# Patient Record
Sex: MALE | Race: WHITE | NOT HISPANIC OR LATINO | Employment: OTHER | URBAN - METROPOLITAN AREA
[De-identification: names, ages, dates, MRNs, and addresses within clinical notes are randomized per-mention and may not be internally consistent; named-entity substitution may affect disease eponyms.]

---

## 2017-06-06 ENCOUNTER — HOSPITAL ENCOUNTER (EMERGENCY)
Facility: HOSPITAL | Age: 52
Discharge: HOME | End: 2017-06-07
Attending: EMERGENCY MEDICINE
Payer: OTHER GOVERNMENT

## 2017-06-06 VITALS
HEART RATE: 90 BPM | BODY MASS INDEX: 29.78 KG/M2 | OXYGEN SATURATION: 96 % | HEIGHT: 70 IN | TEMPERATURE: 98 F | DIASTOLIC BLOOD PRESSURE: 87 MMHG | SYSTOLIC BLOOD PRESSURE: 185 MMHG | RESPIRATION RATE: 18 BRPM | WEIGHT: 208 LBS

## 2017-06-06 DIAGNOSIS — R06.02 SOB (SHORTNESS OF BREATH): Primary | ICD-10-CM

## 2017-06-06 DIAGNOSIS — F10.10 ALCOHOL ABUSE: ICD-10-CM

## 2017-06-06 LAB
ALBUMIN SERPL BCP-MCNC: 4.1 G/DL
ALP SERPL-CCNC: 78 U/L
ALT SERPL W/O P-5'-P-CCNC: 114 U/L
ANION GAP SERPL CALC-SCNC: 16 MMOL/L
AST SERPL-CCNC: 93 U/L
BASOPHILS # BLD AUTO: 0.07 K/UL
BASOPHILS NFR BLD: 1.3 %
BILIRUB SERPL-MCNC: 0.8 MG/DL
BUN SERPL-MCNC: 8 MG/DL
CALCIUM SERPL-MCNC: 9.6 MG/DL
CHLORIDE SERPL-SCNC: 103 MMOL/L
CO2 SERPL-SCNC: 22 MMOL/L
CREAT SERPL-MCNC: 1 MG/DL
DIFFERENTIAL METHOD: ABNORMAL
EOSINOPHIL # BLD AUTO: 0.1 K/UL
EOSINOPHIL NFR BLD: 1.3 %
ERYTHROCYTE [DISTWIDTH] IN BLOOD BY AUTOMATED COUNT: 13.6 %
EST. GFR  (AFRICAN AMERICAN): >60 ML/MIN/1.73 M^2
EST. GFR  (NON AFRICAN AMERICAN): >60 ML/MIN/1.73 M^2
ETHANOL SERPL-MCNC: 66 MG/DL
GLUCOSE SERPL-MCNC: 147 MG/DL
HCT VFR BLD AUTO: 43.2 %
HGB BLD-MCNC: 15.4 G/DL
LYMPHOCYTES # BLD AUTO: 1.1 K/UL
LYMPHOCYTES NFR BLD: 21.1 %
MCH RBC QN AUTO: 31.1 PG
MCHC RBC AUTO-ENTMCNC: 35.6 %
MCV RBC AUTO: 87 FL
MONOCYTES # BLD AUTO: 0.5 K/UL
MONOCYTES NFR BLD: 10.1 %
NEUTROPHILS # BLD AUTO: 3.5 K/UL
NEUTROPHILS NFR BLD: 66 %
PLATELET # BLD AUTO: 180 K/UL
PMV BLD AUTO: 10.1 FL
POTASSIUM SERPL-SCNC: 3.5 MMOL/L
PROT SERPL-MCNC: 7.9 G/DL
RBC # BLD AUTO: 4.95 M/UL
SODIUM SERPL-SCNC: 141 MMOL/L
WBC # BLD AUTO: 5.27 K/UL

## 2017-06-06 PROCEDURE — 80320 DRUG SCREEN QUANTALCOHOLS: CPT

## 2017-06-06 PROCEDURE — 96361 HYDRATE IV INFUSION ADD-ON: CPT

## 2017-06-06 PROCEDURE — 25000003 PHARM REV CODE 250: Performed by: EMERGENCY MEDICINE

## 2017-06-06 PROCEDURE — 63600175 PHARM REV CODE 636 W HCPCS: Performed by: EMERGENCY MEDICINE

## 2017-06-06 PROCEDURE — 96374 THER/PROPH/DIAG INJ IV PUSH: CPT

## 2017-06-06 PROCEDURE — 85025 COMPLETE CBC W/AUTO DIFF WBC: CPT

## 2017-06-06 PROCEDURE — 99284 EMERGENCY DEPT VISIT MOD MDM: CPT | Mod: 25

## 2017-06-06 PROCEDURE — 80053 COMPREHEN METABOLIC PANEL: CPT

## 2017-06-06 PROCEDURE — 96375 TX/PRO/DX INJ NEW DRUG ADDON: CPT

## 2017-06-06 RX ORDER — ONDANSETRON 2 MG/ML
4 INJECTION INTRAMUSCULAR; INTRAVENOUS
Status: COMPLETED | OUTPATIENT
Start: 2017-06-06 | End: 2017-06-06

## 2017-06-06 RX ORDER — LABETALOL HYDROCHLORIDE 5 MG/ML
10 INJECTION, SOLUTION INTRAVENOUS
Status: COMPLETED | OUTPATIENT
Start: 2017-06-06 | End: 2017-06-06

## 2017-06-06 RX ORDER — CHLORDIAZEPOXIDE HYDROCHLORIDE 5 MG/1
10 CAPSULE, GELATIN COATED ORAL 4 TIMES DAILY PRN
Qty: 20 CAPSULE | Refills: 0 | Status: SHIPPED | OUTPATIENT
Start: 2017-06-06 | End: 2017-07-06

## 2017-06-06 RX ORDER — DIAZEPAM 5 MG/1
10 TABLET ORAL
Status: COMPLETED | OUTPATIENT
Start: 2017-06-06 | End: 2017-06-06

## 2017-06-06 RX ORDER — ASPIRIN 81 MG/1
81 TABLET ORAL DAILY
COMMUNITY

## 2017-06-06 RX ORDER — DIAZEPAM 10 MG/2ML
5 INJECTION INTRAMUSCULAR
Status: COMPLETED | OUTPATIENT
Start: 2017-06-06 | End: 2017-06-06

## 2017-06-06 RX ORDER — SIMVASTATIN 20 MG/1
20 TABLET, FILM COATED ORAL NIGHTLY
COMMUNITY

## 2017-06-06 RX ORDER — METOPROLOL SUCCINATE 50 MG/1
50 TABLET, EXTENDED RELEASE ORAL DAILY
COMMUNITY

## 2017-06-06 RX ORDER — VALSARTAN AND HYDROCHLOROTHIAZIDE 320; 25 MG/1; MG/1
1 TABLET, FILM COATED ORAL DAILY
COMMUNITY

## 2017-06-06 RX ADMIN — DIAZEPAM 5 MG: 5 INJECTION, SOLUTION INTRAMUSCULAR; INTRAVENOUS at 08:06

## 2017-06-06 RX ADMIN — DIAZEPAM 10 MG: 5 TABLET ORAL at 11:06

## 2017-06-06 RX ADMIN — ONDANSETRON 4 MG: 2 INJECTION INTRAMUSCULAR; INTRAVENOUS at 11:06

## 2017-06-06 RX ADMIN — SODIUM CHLORIDE 1000 ML: 0.9 INJECTION, SOLUTION INTRAVENOUS at 08:06

## 2017-06-06 RX ADMIN — LABETALOL HYDROCHLORIDE 10 MG: 5 INJECTION, SOLUTION INTRAVENOUS at 08:06

## 2017-06-06 RX ADMIN — LABETALOL HYDROCHLORIDE 10 MG: 5 INJECTION, SOLUTION INTRAVENOUS at 11:06

## 2017-06-06 RX ADMIN — ONDANSETRON 4 MG: 2 INJECTION INTRAMUSCULAR; INTRAVENOUS at 10:06

## 2017-06-07 NOTE — ED PROVIDER NOTES
"Encounter Date: 6/6/2017    SCRIBE #1 NOTE: I, Smith Perri , am scribing for, and in the presence of,  Reuben Salazar MD. I have scribed the following portions of the note - Other sections scribed: HPI, ROS.       History     Chief Complaint   Patient presents with    Anxiety     Pt reports he can't take a deep breath and shaking hands. Reports anxiety attack in the past. No meds taken. Spouse types me a text msg and shows me "He has been trying to stop drinking for two weeks now"      Review of patient's allergies indicates:   Allergen Reactions    Ibuprofen Rash     CC: Anxiety     HPI: This 51 y.o. Male former smoker with a medical history of Hyperlipidemia and Hypertension presents to the ED accompanied by spouse c/o acute onset shortness of breath, nausea, vomiting, rhinorrhea, and anxiety attributed to withdrawal from alcohol. Shortness of breath began 3 hours ago. Pt has a history of heavy drinking and has had multiple attempts at quitting. Pt's last drink was earlier today, about a beer can amount. Pt also reports drinking EtOH 1 day ago as well.  Last attempt at quitting lasted about 1-2 months before relapsing. Denies attempting outpatient treatment for quitting EtOH. Pt reports compliance with daily HTN medication. Denies fever, sore throat, cough, or any other associated symptoms. Pt has no modifying factors. Pt has no prior treatment.       The history is provided by the patient. No  was used.     Past Medical History:   Diagnosis Date    Hyperlipidemia     Hypertension      Past Surgical History:   Procedure Laterality Date    CARDIAC SURGERY       Family History   Problem Relation Age of Onset    Hypertension Mother      Social History   Substance Use Topics    Smoking status: Former Smoker    Smokeless tobacco: Not on file    Alcohol use Yes     Review of Systems   Constitutional: Negative for fever.   HENT: Positive for rhinorrhea. Negative for sore throat.  "   Respiratory: Positive for shortness of breath. Negative for cough.    Cardiovascular: Negative for chest pain.   Gastrointestinal: Positive for nausea and vomiting.   Genitourinary: Negative for dysuria.   Musculoskeletal: Negative for back pain.   Skin: Negative for rash.   Neurological: Negative for weakness.   Hematological: Does not bruise/bleed easily.       Physical Exam     Initial Vitals [06/06/17 1925]   BP Pulse Resp Temp SpO2   (!) 203/111 110 18 99.2 °F (37.3 °C) 97 %     Physical Exam    Vitals reviewed.  Constitutional: He appears well-developed and well-nourished.   HENT:   Head: Normocephalic and atraumatic.   Eyes: EOM are normal. Pupils are equal, round, and reactive to light.   Neck: Normal range of motion. Neck supple.   Cardiovascular: Normal rate, regular rhythm, normal heart sounds and intact distal pulses.   Pulmonary/Chest: Breath sounds normal. No tachypnea and no bradypnea. No respiratory distress. He has no decreased breath sounds. He has no wheezes. He has no rhonchi. He has no rales.   Abdominal: Soft. Bowel sounds are normal.   Musculoskeletal: Normal range of motion.   Neurological: He is alert and oriented to person, place, and time.   Skin: Skin is warm and dry.   Psychiatric: His mood appears anxious.         ED Course   Procedures  Labs Reviewed   COMPREHENSIVE METABOLIC PANEL - Abnormal; Notable for the following:        Result Value    CO2 22 (*)     Glucose 147 (*)     AST 93 (*)      (*)     All other components within normal limits   CBC W/ AUTO DIFFERENTIAL - Abnormal; Notable for the following:     MCH 31.1 (*)     All other components within normal limits   ALCOHOL,MEDICAL (ETHANOL) - Abnormal; Notable for the following:     Alcohol, Medical, Serum 66 (*)     All other components within normal limits             Medical Decision Making:   Differential Diagnosis:   COPD, pneumonia, PE, metabolic disturbance, anxiety, acute alcohol withdrawl  ED Management:  On  initial assessment the patient was anxious, tachycardic, and hypertensive. He was breathing normally with nl breath sounds.   After treatment with valium and labetalol, the patient reports complete resolution of symptoms. He is now calm. Remains with clear BS and no hypoxia.   I feel this patient was having some alcohol withdrawal related anxiety. I do not think he requires admission for IV benzo therapy. Pt has good social support (his wife is here with him). He is a good candidate for outpatient referral and medication therapy.   No pneumonia on cxr. No PE risk factors. Doubt PE.    The results and physical exam findings were reviewed with the patient. Pt agrees with assessment, disposition and treatment plan and has no further questions or complaints at this time.    DEBORAH Salazar M.D. 9:22 PM 6/6/2017              Scribe Attestation:   Scribe #1: I performed the above scribed service and the documentation accurately describes the services I performed. I attest to the accuracy of the note.    Attending Attestation:           Physician Attestation for Scribe:  Physician Attestation Statement for Scribe #1: I, Reuben Salazar MD, reviewed documentation, as scribed by Luis Rayo in my presence, and it is both accurate and complete.                 ED Course     Clinical Impression:   The primary encounter diagnosis was SOB (shortness of breath). A diagnosis of Alcohol abuse was also pertinent to this visit.          Reuben Salazar MD  06/07/17 0144

## 2017-06-07 NOTE — DISCHARGE INSTRUCTIONS
[facility] Supported Outpatient Clinics:    1. [facility], [address], [place] , [phone]    2. [facility], [address] [facility] [address] [facility], [place], [phone]    3. [facility]: [address] [place], [phone]    4. [facility], [address], [place] [phone]    5. [facility]: [address], [phone]    6. [facility], [address], [address], [phone]    [facility] and Private Outpatient Clinics:    7. [facility], [address] [facility] [address] [address] [facility], [facility] 3rd floor, [facility], [phone], Bib Gilbert, [place]    8. [facility], [address] [place] [address], [address] [facility], [place] [phone]    9. [facility]:: [address] [place] [address], [phone], [phone]    10. [facility], [address] [facility] [address] [facility], [address], [phone], Dr. Lynn Tapia, Chief Psychiatrist    [facility]:    16. Alcoholics Anonymous: AA at [facility], [address] [facility], Wednesdays 7pm, call [phone]    17. [facility]: [address] [facility] [address] [facility], [place] [phone], David De Lunapen ext [phone] Tuesdays 10am    18. [facility], [address], [place] [phone]    19. [facility]: Michael Tay MD, [phone]    20. [facility], [address] [place] [address], [phone]    21. [facility], [address] [facility], [address], [phone]    Inpatient Drug Rehab:    22. [facility]: [address], Males and Females, [phone]    23. [facility] ([facility]),: [address], females only, [phone], ext [phone], contact Addiskaleb Morton    24. [facility] [address] [facility], [phone]    25. [facility]: [address], [place] [address], [phone]    26. [facility], [address] [place] [address], fee  based: [phone]    27. [facility] program: [phone]

## 2017-06-07 NOTE — ED TRIAGE NOTES
Pt states that he started with SOB about 2 hours ago, pt states he has episodes like this before in the past but has never been diagnosed with anxiety. Pt denies  Blurred vision, chest pain, numbness/tingling. Pt states he has vomited 3-4 times since the SOB started. Pt currently laying in bed with wife at the bedside. Will continue to monitor.